# Patient Record
Sex: MALE | Race: WHITE | NOT HISPANIC OR LATINO | URBAN - METROPOLITAN AREA
[De-identification: names, ages, dates, MRNs, and addresses within clinical notes are randomized per-mention and may not be internally consistent; named-entity substitution may affect disease eponyms.]

---

## 2017-01-16 ENCOUNTER — APPOINTMENT (RX ONLY)
Dept: URBAN - METROPOLITAN AREA SURGERY 2 | Facility: SURGERY | Age: 82
Setting detail: DERMATOLOGY
End: 2017-01-16

## 2017-01-16 PROBLEM — C43.61 MALIGNANT MELANOMA OF RIGHT UPPER LIMB, INCLUDING SHOULDER: Status: ACTIVE | Noted: 2017-01-16

## 2017-01-16 PROCEDURE — ? PATIENT SPECIFIC COUNSELING

## 2017-01-16 PROCEDURE — ? PRE-OP WORKLIST

## 2017-01-16 PROCEDURE — 99201: CPT

## 2017-01-16 PROCEDURE — ? COUNSELING - MELANOMA

## 2017-01-16 PROCEDURE — ? PHOTOS OBTAINED

## 2017-01-16 NOTE — PROCEDURE: PATIENT SPECIFIC COUNSELING
Detail Level: Zone
Other (Free Text): Informed patient that I will most likely be performing a flap following the excision. Patient should expect to have major limitations in his use of his right arm (1 month of no lifting). Explained that the closure will fall a part if he starts moving too much following the surgery.

## 2017-01-16 NOTE — PROCEDURE: PRE-OP WORKLIST
Surgeon: Jose Mazariegos
Surgery Scheduled: Local advancement myocutaneous flap from the latissimus or pec muscle
Detail Level: Simple

## 2017-01-16 NOTE — PROCEDURE: PHOTOS OBTAINED
Detail Level: Simple
Follow-Up Increment: 0
Follow-Up For Additional Photos?: no
Photographed Locations: Photos were obtained.

## 2017-02-28 ENCOUNTER — APPOINTMENT (RX ONLY)
Dept: URBAN - METROPOLITAN AREA SURGERY 2 | Facility: SURGERY | Age: 82
Setting detail: DERMATOLOGY
End: 2017-02-28

## 2017-02-28 DIAGNOSIS — Z85.820 PERSONAL HISTORY OF MALIGNANT MELANOMA OF SKIN: ICD-10-CM

## 2017-02-28 PROCEDURE — ? PATIENT SPECIFIC COUNSELING

## 2017-02-28 PROCEDURE — 99212 OFFICE O/P EST SF 10 MIN: CPT

## 2017-02-28 PROCEDURE — ? POST-OP CHECK

## 2017-02-28 ASSESSMENT — LOCATION SIMPLE DESCRIPTION DERM: LOCATION SIMPLE: RIGHT SHOULDER

## 2017-02-28 ASSESSMENT — LOCATION ZONE DERM: LOCATION ZONE: ARM

## 2017-02-28 ASSESSMENT — LOCATION DETAILED DESCRIPTION DERM: LOCATION DETAILED: RIGHT ANTERIOR SHOULDER

## 2017-02-28 NOTE — PROCEDURE: PATIENT SPECIFIC COUNSELING
Detail Level: Zone
Other (Free Text): I informed patient that the incision site is healing well. Informed patient that there is a little red irritation around sutures, and I would like for patient to discontinue wearing the bandages and to keep sutures open and exposed to air. \\nI advised patient that he is cleared to Shower today, however patient will need to keep incision covered with Saran Wrap. Patient was informed not to get incision wet. \\nPatient to return March 6th.

## 2017-03-06 ENCOUNTER — APPOINTMENT (RX ONLY)
Dept: URBAN - METROPOLITAN AREA SURGERY 2 | Facility: SURGERY | Age: 82
Setting detail: DERMATOLOGY
End: 2017-03-06

## 2017-03-06 DIAGNOSIS — Z85.820 PERSONAL HISTORY OF MALIGNANT MELANOMA OF SKIN: ICD-10-CM

## 2017-03-06 PROCEDURE — ? PATIENT SPECIFIC COUNSELING

## 2017-03-06 PROCEDURE — ? POST-OP CHECK

## 2017-03-06 PROCEDURE — ? SUTURE REMOVAL

## 2017-03-06 PROCEDURE — 99212 OFFICE O/P EST SF 10 MIN: CPT

## 2017-03-06 ASSESSMENT — LOCATION ZONE DERM: LOCATION ZONE: ARM

## 2017-03-06 ASSESSMENT — LOCATION DETAILED DESCRIPTION DERM: LOCATION DETAILED: LEFT POSTERIOR SHOULDER

## 2017-03-06 ASSESSMENT — LOCATION SIMPLE DESCRIPTION DERM: LOCATION SIMPLE: LEFT SHOULDER

## 2017-03-06 NOTE — PROCEDURE: PATIENT SPECIFIC COUNSELING
Other (Free Text): I informed patient that the swelling in his shoulder is normal and will go down with time. I reassured the patient the the redness is not infection. I informed patient that if after 4 months the bulging in his skin does not improve I will discuss with him about repair.  I removed some stitches today. \\nPatient is to return back to the office in 10 days.
Detail Level: Zone

## 2017-03-14 ENCOUNTER — APPOINTMENT (RX ONLY)
Dept: URBAN - METROPOLITAN AREA SURGERY 2 | Facility: SURGERY | Age: 82
Setting detail: DERMATOLOGY
End: 2017-03-14

## 2017-03-14 DIAGNOSIS — Z48.89 ENCOUNTER FOR OTHER SPECIFIED SURGICAL AFTERCARE: ICD-10-CM

## 2017-03-14 PROCEDURE — 99024 POSTOP FOLLOW-UP VISIT: CPT

## 2017-03-14 PROCEDURE — ? POST-OP CHECK

## 2017-03-14 PROCEDURE — ? PATIENT SPECIFIC COUNSELING

## 2017-03-14 PROCEDURE — ? SUTURE REMOVAL

## 2017-03-14 ASSESSMENT — LOCATION ZONE DERM: LOCATION ZONE: ARM

## 2017-03-14 ASSESSMENT — LOCATION SIMPLE DESCRIPTION DERM: LOCATION SIMPLE: RIGHT SHOULDER

## 2017-03-14 ASSESSMENT — LOCATION DETAILED DESCRIPTION DERM: LOCATION DETAILED: RIGHT LATERAL SHOULDER

## 2017-03-14 NOTE — PROCEDURE: PATIENT SPECIFIC COUNSELING
Detail Level: Simple
Other (Free Text): Informed patient that he is healing well.\\nDiscussed with the patient that Dr. Bray can go back into surgery to remove more of the melanoma at any point because he is pretty much healed.\\nPatient can shower and get the scabs off. \\nDiscussed the second surgery with the patient and he should expect another big flap.

## 2017-03-28 ENCOUNTER — APPOINTMENT (RX ONLY)
Dept: URBAN - METROPOLITAN AREA SURGERY 2 | Facility: SURGERY | Age: 82
Setting detail: DERMATOLOGY
End: 2017-03-28

## 2017-03-28 ENCOUNTER — RX ONLY (OUTPATIENT)
Age: 82
Setting detail: RX ONLY
End: 2017-03-28

## 2017-03-28 DIAGNOSIS — Z48.89 ENCOUNTER FOR OTHER SPECIFIED SURGICAL AFTERCARE: ICD-10-CM

## 2017-03-28 PROCEDURE — ? PATIENT SPECIFIC COUNSELING

## 2017-03-28 PROCEDURE — ? POST-OP CHECK

## 2017-03-28 PROCEDURE — 99024 POSTOP FOLLOW-UP VISIT: CPT

## 2017-03-28 RX ORDER — AMOXICILLIN AND CLAVULANATE POTASSIUM 875; 125 MG/1; 1/1
TABLET, FILM COATED ORAL
Qty: 28 | Refills: 0 | Status: ERX | COMMUNITY
Start: 2017-03-28

## 2017-03-28 ASSESSMENT — LOCATION ZONE DERM: LOCATION ZONE: ARM

## 2017-03-28 ASSESSMENT — LOCATION DETAILED DESCRIPTION DERM: LOCATION DETAILED: RIGHT POSTERIOR SHOULDER

## 2017-03-28 ASSESSMENT — LOCATION SIMPLE DESCRIPTION DERM: LOCATION SIMPLE: RIGHT SHOULDER

## 2017-03-28 NOTE — PROCEDURE: PATIENT SPECIFIC COUNSELING
Detail Level: Simple
Other (Free Text): Informed patient that the open wounds show evidence of an infection from the culture. I advised the patient to take antibiotics (augmentin).\\nPatient to continue cleaning the wound with vinegar and water and then apply medihoney or manuka honey and then a bandage twice a day.\\nDiscussed with the patient that he should talk to Dr. carter about his next step regarding the melanoma being present still.

## 2017-04-10 ENCOUNTER — APPOINTMENT (RX ONLY)
Dept: URBAN - METROPOLITAN AREA SURGERY 2 | Facility: SURGERY | Age: 82
Setting detail: DERMATOLOGY
End: 2017-04-10

## 2017-04-10 DIAGNOSIS — Z48.89 ENCOUNTER FOR OTHER SPECIFIED SURGICAL AFTERCARE: ICD-10-CM

## 2017-04-10 PROCEDURE — ? POST-OP CHECK

## 2017-04-10 PROCEDURE — 99024 POSTOP FOLLOW-UP VISIT: CPT

## 2017-04-10 PROCEDURE — ? PATIENT SPECIFIC COUNSELING

## 2017-04-10 ASSESSMENT — LOCATION DETAILED DESCRIPTION DERM: LOCATION DETAILED: RIGHT LATERAL SHOULDER

## 2017-04-10 ASSESSMENT — LOCATION ZONE DERM: LOCATION ZONE: ARM

## 2017-04-10 ASSESSMENT — LOCATION SIMPLE DESCRIPTION DERM: LOCATION SIMPLE: RIGHT SHOULDER

## 2017-04-10 NOTE — PROCEDURE: PATIENT SPECIFIC COUNSELING
Other (Free Text): Informed patient that the open wounds are healing in. Patient is a little red in the area today.\\nI discussed the patients current situation and highly advised the patient to discuss the recurrent melanoma with Dr. carter. I advised that The radiation is the patients better choice right now.\\nPatient to continue caring for the wounds as he has.\\nPatient can discontinue the antibiotic.
Detail Level: Simple

## 2017-05-02 ENCOUNTER — APPOINTMENT (RX ONLY)
Dept: URBAN - METROPOLITAN AREA SURGERY 2 | Facility: SURGERY | Age: 82
Setting detail: DERMATOLOGY
End: 2017-05-02

## 2017-05-02 DIAGNOSIS — Z48.89 ENCOUNTER FOR OTHER SPECIFIED SURGICAL AFTERCARE: ICD-10-CM

## 2017-05-02 PROCEDURE — ? PATIENT SPECIFIC COUNSELING

## 2017-05-02 PROCEDURE — ? POST-OP CHECK

## 2017-05-02 PROCEDURE — 99212 OFFICE O/P EST SF 10 MIN: CPT

## 2017-05-02 ASSESSMENT — LOCATION DETAILED DESCRIPTION DERM: LOCATION DETAILED: RIGHT LATERAL SHOULDER

## 2017-05-02 ASSESSMENT — LOCATION ZONE DERM: LOCATION ZONE: ARM

## 2017-05-02 ASSESSMENT — LOCATION SIMPLE DESCRIPTION DERM: LOCATION SIMPLE: RIGHT SHOULDER

## 2017-05-02 NOTE — PROCEDURE: PATIENT SPECIFIC COUNSELING
Detail Level: Simple
Other (Free Text): Informed patient that the wound is almost healed.\\nPatient to continue using the manuka honey and keep it out of the shower. \\nI discussed the patients case with him again today. I explained if he were to get another excision, I will need to take a piece of muscle and tissue to close the hole. This is a longer recovery than his first surgery and will need to avoid all heavy lift on his right arm for 2 months. I don't recommend a skin graft in that area as it will not be durable. Advised patient that I will make the final decision after the excision. \\nReassured patient that I will close the wound if patient were to have another procedure.

## 2017-07-14 ENCOUNTER — APPOINTMENT (RX ONLY)
Dept: URBAN - METROPOLITAN AREA SURGERY 2 | Facility: SURGERY | Age: 82
Setting detail: DERMATOLOGY
End: 2017-07-14

## 2017-07-14 DIAGNOSIS — Z48.89 ENCOUNTER FOR OTHER SPECIFIED SURGICAL AFTERCARE: ICD-10-CM

## 2017-07-14 PROCEDURE — 99024 POSTOP FOLLOW-UP VISIT: CPT

## 2017-07-14 PROCEDURE — ? PATIENT SPECIFIC COUNSELING

## 2017-07-14 PROCEDURE — ? POST-OP CHECK

## 2017-07-14 ASSESSMENT — LOCATION SIMPLE DESCRIPTION DERM: LOCATION SIMPLE: RIGHT SHOULDER

## 2017-07-14 ASSESSMENT — LOCATION ZONE DERM: LOCATION ZONE: ARM

## 2017-07-14 ASSESSMENT — LOCATION DETAILED DESCRIPTION DERM: LOCATION DETAILED: RIGHT LATERAL SHOULDER

## 2017-07-14 NOTE — PROCEDURE: PATIENT SPECIFIC COUNSELING
Detail Level: Simple
Other (Free Text): Informed patient that his flap is healing. \\nDiscussed with patient to keep area dry until his next follow-up in 2 weeks.\\nAlso discussed with patient to keep area open.\\nAdvised patient to continue to take it easy, to avoid any sutures from popping.

## 2017-07-31 ENCOUNTER — APPOINTMENT (RX ONLY)
Dept: URBAN - METROPOLITAN AREA SURGERY 2 | Facility: SURGERY | Age: 82
Setting detail: DERMATOLOGY
End: 2017-07-31

## 2017-07-31 DIAGNOSIS — Z48.89 ENCOUNTER FOR OTHER SPECIFIED SURGICAL AFTERCARE: ICD-10-CM

## 2017-07-31 PROCEDURE — 99024 POSTOP FOLLOW-UP VISIT: CPT

## 2017-07-31 PROCEDURE — ? SUTURE REMOVAL

## 2017-07-31 PROCEDURE — ? PATIENT SPECIFIC COUNSELING

## 2017-07-31 PROCEDURE — ? POST-OP CHECK

## 2017-07-31 ASSESSMENT — LOCATION ZONE DERM: LOCATION ZONE: ARM

## 2017-07-31 ASSESSMENT — LOCATION DETAILED DESCRIPTION DERM: LOCATION DETAILED: RIGHT LATERAL SHOULDER

## 2017-07-31 ASSESSMENT — LOCATION SIMPLE DESCRIPTION DERM: LOCATION SIMPLE: RIGHT SHOULDER

## 2017-07-31 NOTE — PROCEDURE: PATIENT SPECIFIC COUNSELING
Other (Free Text): Informed patient that his flap is healing well.\\nDiscussed with patient to tape until his next follow-up in 3 months. \\nExplained to patient to continue taking it easy.\\nDiscussed with patient that if he notices any sign of infection or that the wound is reopening, he should return to clinic immediately.
Detail Level: Simple

## 2017-10-16 ENCOUNTER — APPOINTMENT (RX ONLY)
Dept: URBAN - METROPOLITAN AREA CLINIC 12 | Facility: CLINIC | Age: 82
Setting detail: DERMATOLOGY
End: 2017-10-16

## 2017-10-16 DIAGNOSIS — Z48.89 ENCOUNTER FOR OTHER SPECIFIED SURGICAL AFTERCARE: ICD-10-CM

## 2017-10-16 PROCEDURE — ? COUNSELING - POST-OP CHECK

## 2017-10-16 PROCEDURE — ? POST-OP CHECK

## 2017-10-16 PROCEDURE — ? PATIENT SPECIFIC COUNSELING

## 2017-10-16 PROCEDURE — 99213 OFFICE O/P EST LOW 20 MIN: CPT

## 2017-10-16 PROCEDURE — ? PHOTOS OBTAINED

## 2017-10-16 ASSESSMENT — LOCATION DETAILED DESCRIPTION DERM: LOCATION DETAILED: RIGHT POSTERIOR SHOULDER

## 2017-10-16 ASSESSMENT — LOCATION SIMPLE DESCRIPTION DERM: LOCATION SIMPLE: RIGHT SHOULDER

## 2017-10-16 ASSESSMENT — LOCATION ZONE DERM: LOCATION ZONE: ARM

## 2017-10-16 NOTE — PROCEDURE: POST-OP CHECK
Detail Level: Detailed
Body Location Override (Optional): right shoulder
Add Postop Global No-Charge Code (47169)?: no
Wound Evaluated By: Dr. Mazariegos

## 2017-10-16 NOTE — PROCEDURE: PHOTOS OBTAINED
Detail Level: Detailed
Photographed Locations: Photos were obtained of the surgical site(s).
Follow-Up For Additional Photos?: no

## 2017-10-16 NOTE — HPI: MOHS RECONSTRUCTION SINGLE DEFECT EVALUATION
How Many Reconstruction Sites Are To Be Evaluated?: 1
Is This A New Presentation, Presentation For Surgery, Or A Follow-Up?: Follow Up Mohs Closure
Since Your Previous Visit, Your Skin Lesions Are:: better
Additional History: Patient is here for a post-op check for a local flap that was done in January 2017 to repair an opening where melanoma was excised by Mohs surgery.
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
